# Patient Record
(demographics unavailable — no encounter records)

---

## 2025-01-14 NOTE — BEGINNING OF VISIT
[0] : 2) Feeling down, depressed, or hopeless: Not at all (0) [PHQ-2 Negative] : PHQ-2 Negative [Pain Scale: ___] : On a scale of 1-10, today the patient's pain is a(n) [unfilled]. [Never] : Never [Reviewed, no changes] : Reviewed, no changes [[id number]] : 0

## 2025-01-14 NOTE — HISTORY OF PRESENT ILLNESS
[de-identified] :  sergei#[id number] A 38-year-old here with anemia. Currently 35 weeks pregnant. Due date February 14,2025. 4th pregnancy. No anemia in prior pregnancies  Had anemia prior to pregnancy, was on iron supplements. c/o constipation.  Told she was 1.5 months ago.  Has been compliant with iron supplements.  Denies history of thyroid or celiac disease.  Has heavy menses. lasts for 14 days. Age of menarche: age 15  Denies EGD or colonoscopy  Patient has fatigue and SOB on exertion  Patient has taken iron supplements. Never had iron infusions

## 2025-01-14 NOTE — BEGINNING OF VISIT
[0] : 2) Feeling down, depressed, or hopeless: Not at all (0) [PHQ-2 Negative] : PHQ-2 Negative [Pain Scale: ___] : On a scale of 1-10, today the patient's pain is a(n) [unfilled]. [Never] : Never [Reviewed, no changes] : Reviewed, no changes [[id number]] : 0

## 2025-01-14 NOTE — ASSESSMENT
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

## 2025-01-14 NOTE — HISTORY OF PRESENT ILLNESS
[de-identified] :  sergei#[id number] A 38-year-old here with anemia. Currently 35 weeks pregnant. Due date February 14,2025. 4th pregnancy. No anemia in prior pregnancies  Had anemia prior to pregnancy, was on iron supplements. c/o constipation.  Told she was 1.5 months ago.  Has been compliant with iron supplements.  Denies history of thyroid or celiac disease.  Has heavy menses. lasts for 14 days. Age of menarche: age 15  Denies EGD or colonoscopy  Patient has fatigue and SOB on exertion  Patient has taken iron supplements. Never had iron infusions

## 2025-01-14 NOTE — REVIEW OF SYSTEMS
[Fatigue] : fatigue [Leg Claudication] : intermittent leg claudication [SOB on Exertion] : shortness of breath during exertion [Vomiting] : vomiting [Incontinence] : incontinence [Negative] : Heme/Lymph [FreeTextEntry5] : BL [FreeTextEntry7] : 3-4 / week

## 2025-03-18 NOTE — REVIEW OF SYSTEMS
[Fatigue] : fatigue [Leg Claudication] : intermittent leg claudication [SOB on Exertion] : shortness of breath during exertion [Vomiting] : vomiting [Incontinence] : incontinence [FreeTextEntry7] : 3-4 / week [Diarrhea: Grade 0] : Diarrhea: Grade 0 [Negative] : Allergic/Immunologic [FreeTextEntry5] : BL

## 2025-03-18 NOTE — ASSESSMENT
[FreeTextEntry1] :  #H/O Iron deficiency anemia of pregnancy  4 weeks post partum today  - H/H in Jan 2025: 11.4/34.5. March 2025: 12.2/36.1 - No longer anemic. labs today to r/o iron deficiency   - s/p venofer 200 mg iv iron infusions 5/5  - Not on oral iron at this time - Will repeat labs and if ferritin below 50 to consider further iron infusions    If ferritin ; pt to consider oral Vs. liquid iron  - further txt pending above's findings.   #Immature agranulocytosis -elevated during pregnancy - CBC today with improvement in granulocytes March 2025: 0.5 %  - OV in 3-4 months to repeat.    OV 3-4 months Labs next appointment: CBC, CMP, iron, B12, folate ferritin, retic

## 2025-03-18 NOTE — HISTORY OF PRESENT ILLNESS
[de-identified] :  sergei#[id number] A 38-year-old here with anemia. Currently 35 weeks pregnant. Due date February 14,2025. 4th pregnancy. No anemia in prior pregnancies  Had anemia prior to pregnancy, was on iron supplements. c/o constipation.  Told she was 1.5 months ago.  Has been compliant with iron supplements.  Denies history of thyroid or celiac disease.  Has heavy menses. lasts for 14 days. Age of menarche: age 15  Denies EGD or colonoscopy  Patient has fatigue and SOB on exertion  Patient has taken iron supplements. Never had iron infusions  Fam Hx: Denies any hematological nor malignancies  social Hx: Denies etoh use, smoking nor illicit drug use  ethniciy: [place]ian Diet: Eats red meats, green leafy vegetables poultry and fish   [de-identified] : 03/17/2025 - Here for follow up. 1 month post partum. Completed venofer 5/5 iron infusions. Denies any SE  - No complications during vaginal delivery  - Not on any vitamins at this time  - Denies Fever, chills, unint wt loss/ wt gain, dizziness, headaches, chest pain, SOB, Chronic cough, reflux, heartburn, dysphagia, early satiety, food avoidance, abd pain, bloating, cramping, N/V/C/D, BRBPR, black stool,  ect.      Fam Hx:  social Hx

## 2025-03-18 NOTE — HISTORY OF PRESENT ILLNESS
[de-identified] :  sergei#[id number] A 38-year-old here with anemia. Currently 35 weeks pregnant. Due date February 14,2025. 4th pregnancy. No anemia in prior pregnancies  Had anemia prior to pregnancy, was on iron supplements. c/o constipation.  Told she was 1.5 months ago.  Has been compliant with iron supplements.  Denies history of thyroid or celiac disease.  Has heavy menses. lasts for 14 days. Age of menarche: age 15  Denies EGD or colonoscopy  Patient has fatigue and SOB on exertion  Patient has taken iron supplements. Never had iron infusions  Fam Hx: Denies any hematological nor malignancies  social Hx: Denies etoh use, smoking nor illicit drug use  ethniciy: [place] Diet: Eats red meats, green leafy vegetables poultry and fish   [de-identified] : 03/17/2025 - Here for follow up. 1 month post partum. Completed venofer 5/5 iron infusions. Denies any SE  - No complications during vaginal delivery  - Not on any vitamins at this time  - Denies Fever, chills, unint wt loss/ wt gain, dizziness, headaches, chest pain, SOB, Chronic cough, reflux, heartburn, dysphagia, early satiety, food avoidance, abd pain, bloating, cramping, N/V/C/D, BRBPR, black stool,  ect.      Fam Hx:  social Hx

## 2025-03-18 NOTE — REVIEW OF SYSTEMS
[Fatigue] : fatigue [Leg Claudication] : intermittent leg claudication [SOB on Exertion] : shortness of breath during exertion [Incontinence] : incontinence [Vomiting] : vomiting [FreeTextEntry7] : 3-4 / week [Diarrhea: Grade 0] : Diarrhea: Grade 0 [Negative] : Allergic/Immunologic [FreeTextEntry5] : BL

## 2025-03-18 NOTE — HISTORY OF PRESENT ILLNESS
[de-identified] :  sergei#[id number] A 38-year-old here with anemia. Currently 35 weeks pregnant. Due date February 14,2025. 4th pregnancy. No anemia in prior pregnancies  Had anemia prior to pregnancy, was on iron supplements. c/o constipation.  Told she was 1.5 months ago.  Has been compliant with iron supplements.  Denies history of thyroid or celiac disease.  Has heavy menses. lasts for 14 days. Age of menarche: age 15  Denies EGD or colonoscopy  Patient has fatigue and SOB on exertion  Patient has taken iron supplements. Never had iron infusions  Fam Hx: Denies any hematological nor malignancies  social Hx: Denies etoh use, smoking nor illicit drug use  ethniciy: [place] Diet: Eats red meats, green leafy vegetables poultry and fish   [de-identified] : 03/17/2025 - Here for follow up. 1 month post partum. Completed venofer 5/5 iron infusions. Denies any SE  - No complications during vaginal delivery  - Not on any vitamins at this time  - Denies Fever, chills, unint wt loss/ wt gain, dizziness, headaches, chest pain, SOB, Chronic cough, reflux, heartburn, dysphagia, early satiety, food avoidance, abd pain, bloating, cramping, N/V/C/D, BRBPR, black stool,  ect.      Fam Hx:  social Hx

## 2025-06-04 NOTE — COUNSELING
[Contraception/ Emergency Contraception/ Safe Sexual Practices] : contraception, emergency contraception, safe sexual practices [HPV Vaccine] : HPV Vaccine [Pre/Post Op Instructions] : pre/post op instructions

## 2025-06-08 NOTE — HISTORY OF PRESENT ILLNESS
[FreeTextEntry1] : English: [id number] Luis Kong  Patient here for BTL consultation. Denies GYN complaints.  Sexually active.  Exercises.    OB history: SVDx4 last baby 2/2025  GYN history: denies fibroids, cyst, abnormal pap. H xof colposcopy. 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  Colpsocpy states early grade.  Is to repeat pap in 9 months. Reports possible need for removing portion of cervix. All as per patient no records for reivew).   PMH: Iron deficiency Anemia. H Pylori  PSH: denies  Med: H Pylori medications  All: NKDA  SocHx: social etoh, denies tobacco, or drugs

## 2025-06-08 NOTE — HISTORY OF PRESENT ILLNESS
[FreeTextEntry1] : Persian: [id number] Luis Kong  Patient here for BTL consultation. Denies GYN complaints.  Sexually active.  Exercises.    OB history: SVDx4 last baby 2/2025  GYN history: denies fibroids, cyst, abnormal pap. H xof colposcopy. 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  Colpsocpy states early grade.  Is to repeat pap in 9 months. Reports possible need for removing portion of cervix. All as per patient no records for reivew).   PMH: Iron deficiency Anemia. H Pylori  PSH: denies  Med: H Pylori medications  All: NKDA  SocHx: social etoh, denies tobacco, or drugs

## 2025-06-08 NOTE — PHYSICAL EXAM
[Chaperoned Physical Exam] : A chaperone was present in the examining room during all aspects of the physical examination. [Appropriately responsive] : appropriately responsive [No Lymphadenopathy] : no lymphadenopathy [Regular Rate Rhythm] : regular rate rhythm [Clear to Auscultation B/L] : clear to auscultation bilaterally [Soft] : soft [Non-tender] : non-tender [Non-distended] : non-distended [No Lesions] : no lesions [No Mass] : no mass [Oriented x3] : oriented x3 [Labia Majora] : normal [Labia Minora] : normal [Normal] : normal [Uterine Adnexae] : normal [FreeTextEntry2] : YOSEF lipscomb

## 2025-07-14 NOTE — ASSESSMENT
[FreeTextEntry1] :  #H/O Iron deficiency anemia of pregnancy  4 weeks post partum today  - H/H in Jan 2025: 11.4/34.5. March 2025: 12.2/36.1 - No longer anemic. labs today to r/o iron deficiency   - s/p venofer 200 mg iv iron infusions 5/5 dring pregnancy  July 2025  - Labs today to trend  - Not on oral iron at this time. Anemia had resolved post partum.   if ferritin below 50 to consider further iron infusions    #Immature agranulocytosis -elevated during pregnancy - unchanged from last Ov: 0.5% - Dx with H. pylori. repeat stool studies.  - Ov in 3 months to monitor. If continues elevations persist r/o further etiologies  # H. pylori infection  - Stool study to confirm eradication. If test remains positive: pt to see GI     OV 3-4 months Labs next appointment: CBC, CMP, iron, B12, folate ferritin, retic

## 2025-07-14 NOTE — HISTORY OF PRESENT ILLNESS
[de-identified] :  sergei#[id number] A 38-year-old here with anemia. Currently 35 weeks pregnant. Due date February 14,2025. 4th pregnancy. No anemia in prior pregnancies  Had anemia prior to pregnancy, was on iron supplements. c/o constipation.  Told she was 1.5 months ago.  Has been compliant with iron supplements.  Denies history of thyroid or celiac disease.  Has heavy menses. lasts for 14 days. Age of menarche: age 15  Denies EGD or colonoscopy  Patient has fatigue and SOB on exertion  Patient has taken iron supplements. Never had iron infusions  Fam Hx: Denies any hematological nor malignancies  social Hx: Denies etoh use, smoking nor illicit drug use  ethniciy: [place] Diet: Eats red meats, green leafy vegetables poultry and fish   [de-identified] : 03/17/2025 - Here for follow up. 1 month post partum. Completed venofer 5/5 iron infusions. Denies any SE  - No complications during vaginal delivery  - Not on any vitamins at this time  - Denies Fever, chills, unint wt loss/ wt gain, dizziness, headaches, chest pain, SOB, Chronic cough, reflux, heartburn, dysphagia, early satiety, food avoidance, abd pain, bloating, cramping, N/V/C/D, BRBPR, black stool,  ect.  July 2025-- Reports feeling well. Now 5 months post partum. No longer on iron pills nor b12.  - Also reports she was dx with + H. pylori approx 3 months ago at [facility] ( [place]). she has having severe epigastric pain post partum.  txt with amox/clarithromycinc and PPI BID x 14 days. Pain has resolved. Stools are different as per patient.  - Denies any black stool, bloody BM's, hematuria, N/V, C/D ect.

## 2025-07-14 NOTE — HISTORY OF PRESENT ILLNESS
[de-identified] :  sergei#[id number] A 38-year-old here with anemia. Currently 35 weeks pregnant. Due date February 14,2025. 4th pregnancy. No anemia in prior pregnancies  Had anemia prior to pregnancy, was on iron supplements. c/o constipation.  Told she was 1.5 months ago.  Has been compliant with iron supplements.  Denies history of thyroid or celiac disease.  Has heavy menses. lasts for 14 days. Age of menarche: age 15  Denies EGD or colonoscopy  Patient has fatigue and SOB on exertion  Patient has taken iron supplements. Never had iron infusions  Fam Hx: Denies any hematological nor malignancies  social Hx: Denies etoh use, smoking nor illicit drug use  ethniciy: [place] Diet: Eats red meats, green leafy vegetables poultry and fish   [de-identified] : 03/17/2025 - Here for follow up. 1 month post partum. Completed venofer 5/5 iron infusions. Denies any SE  - No complications during vaginal delivery  - Not on any vitamins at this time  - Denies Fever, chills, unint wt loss/ wt gain, dizziness, headaches, chest pain, SOB, Chronic cough, reflux, heartburn, dysphagia, early satiety, food avoidance, abd pain, bloating, cramping, N/V/C/D, BRBPR, black stool,  ect.  July 2025-- Reports feeling well. Now 5 months post partum. No longer on iron pills nor b12.  - Also reports she was dx with + H. pylori approx 3 months ago at the [facility] ( [place]). she has having severe epigastric pain post partum.  txt with amox/clarithromycinc and PPI BID x 14 days. Pain has resolved. Stools are different as per patient.  - Denies any black stool, bloody BM's, hematuria, N/V, C/D ect.